# Patient Record
Sex: MALE | Race: WHITE | NOT HISPANIC OR LATINO | URBAN - METROPOLITAN AREA
[De-identification: names, ages, dates, MRNs, and addresses within clinical notes are randomized per-mention and may not be internally consistent; named-entity substitution may affect disease eponyms.]

---

## 2019-05-03 ENCOUNTER — INPATIENT (INPATIENT)
Facility: HOSPITAL | Age: 44
LOS: 1 days | Discharge: HOME | DRG: 603 | End: 2019-05-05
Attending: GENERAL ACUTE CARE HOSPITAL | Admitting: HOSPITALIST
Payer: COMMERCIAL

## 2019-05-03 VITALS
DIASTOLIC BLOOD PRESSURE: 96 MMHG | TEMPERATURE: 99 F | OXYGEN SATURATION: 96 % | HEIGHT: 74 IN | HEART RATE: 96 BPM | SYSTOLIC BLOOD PRESSURE: 155 MMHG | WEIGHT: 315 LBS | RESPIRATION RATE: 18 BRPM

## 2019-05-03 LAB
ALBUMIN SERPL ELPH-MCNC: 3.5 G/DL — SIGNIFICANT CHANGE UP (ref 3.3–5.2)
ALP SERPL-CCNC: 87 U/L — SIGNIFICANT CHANGE UP (ref 40–120)
ALT FLD-CCNC: 34 U/L — SIGNIFICANT CHANGE UP
ANION GAP SERPL CALC-SCNC: 10 MMOL/L — SIGNIFICANT CHANGE UP (ref 5–17)
AST SERPL-CCNC: 26 U/L — SIGNIFICANT CHANGE UP
BASOPHILS # BLD AUTO: 0 K/UL — SIGNIFICANT CHANGE UP (ref 0–0.2)
BASOPHILS NFR BLD AUTO: 0.2 % — SIGNIFICANT CHANGE UP (ref 0–2)
BILIRUB SERPL-MCNC: 0.3 MG/DL — LOW (ref 0.4–2)
BUN SERPL-MCNC: 13 MG/DL — SIGNIFICANT CHANGE UP (ref 8–20)
CALCIUM SERPL-MCNC: 9 MG/DL — SIGNIFICANT CHANGE UP (ref 8.6–10.2)
CHLORIDE SERPL-SCNC: 102 MMOL/L — SIGNIFICANT CHANGE UP (ref 98–107)
CO2 SERPL-SCNC: 28 MMOL/L — SIGNIFICANT CHANGE UP (ref 22–29)
CREAT SERPL-MCNC: 0.87 MG/DL — SIGNIFICANT CHANGE UP (ref 0.5–1.3)
EOSINOPHIL # BLD AUTO: 0.2 K/UL — SIGNIFICANT CHANGE UP (ref 0–0.5)
EOSINOPHIL NFR BLD AUTO: 2.1 % — SIGNIFICANT CHANGE UP (ref 0–5)
GLUCOSE SERPL-MCNC: 111 MG/DL — SIGNIFICANT CHANGE UP (ref 70–115)
HCT VFR BLD CALC: 42.2 % — SIGNIFICANT CHANGE UP (ref 42–52)
HGB BLD-MCNC: 13.5 G/DL — LOW (ref 14–18)
LACTATE BLDV-MCNC: 1.2 MMOL/L — SIGNIFICANT CHANGE UP (ref 0.5–2)
LYMPHOCYTES # BLD AUTO: 26 % — SIGNIFICANT CHANGE UP (ref 20–55)
LYMPHOCYTES # BLD AUTO: 3.1 K/UL — SIGNIFICANT CHANGE UP (ref 1–4.8)
MCHC RBC-ENTMCNC: 29.5 PG — SIGNIFICANT CHANGE UP (ref 27–31)
MCHC RBC-ENTMCNC: 32 G/DL — SIGNIFICANT CHANGE UP (ref 32–36)
MCV RBC AUTO: 92.1 FL — SIGNIFICANT CHANGE UP (ref 80–94)
MONOCYTES # BLD AUTO: 0.6 K/UL — SIGNIFICANT CHANGE UP (ref 0–0.8)
MONOCYTES NFR BLD AUTO: 4.7 % — SIGNIFICANT CHANGE UP (ref 3–10)
NEUTROPHILS # BLD AUTO: 8 K/UL — SIGNIFICANT CHANGE UP (ref 1.8–8)
NEUTROPHILS NFR BLD AUTO: 66.6 % — SIGNIFICANT CHANGE UP (ref 37–73)
PLATELET # BLD AUTO: 306 K/UL — SIGNIFICANT CHANGE UP (ref 150–400)
POTASSIUM SERPL-MCNC: 3.8 MMOL/L — SIGNIFICANT CHANGE UP (ref 3.5–5.3)
POTASSIUM SERPL-SCNC: 3.8 MMOL/L — SIGNIFICANT CHANGE UP (ref 3.5–5.3)
PROT SERPL-MCNC: 6.8 G/DL — SIGNIFICANT CHANGE UP (ref 6.6–8.7)
RBC # BLD: 4.58 M/UL — LOW (ref 4.6–6.2)
RBC # FLD: 13 % — SIGNIFICANT CHANGE UP (ref 11–15.6)
SODIUM SERPL-SCNC: 140 MMOL/L — SIGNIFICANT CHANGE UP (ref 135–145)
WBC # BLD: 12 K/UL — HIGH (ref 4.8–10.8)
WBC # FLD AUTO: 12 K/UL — HIGH (ref 4.8–10.8)

## 2019-05-03 PROCEDURE — 73590 X-RAY EXAM OF LOWER LEG: CPT | Mod: 26,LT

## 2019-05-03 PROCEDURE — 73620 X-RAY EXAM OF FOOT: CPT | Mod: 26,LT

## 2019-05-03 PROCEDURE — 73610 X-RAY EXAM OF ANKLE: CPT | Mod: 26,LT

## 2019-05-03 PROCEDURE — 99285 EMERGENCY DEPT VISIT HI MDM: CPT

## 2019-05-03 RX ORDER — SODIUM CHLORIDE 9 MG/ML
1000 INJECTION INTRAMUSCULAR; INTRAVENOUS; SUBCUTANEOUS ONCE
Qty: 0 | Refills: 0 | Status: COMPLETED | OUTPATIENT
Start: 2019-05-03 | End: 2019-05-03

## 2019-05-03 RX ORDER — KETOROLAC TROMETHAMINE 30 MG/ML
15 SYRINGE (ML) INJECTION ONCE
Qty: 0 | Refills: 0 | Status: DISCONTINUED | OUTPATIENT
Start: 2019-05-03 | End: 2019-05-03

## 2019-05-03 RX ADMIN — SODIUM CHLORIDE 1000 MILLILITER(S): 9 INJECTION INTRAMUSCULAR; INTRAVENOUS; SUBCUTANEOUS at 22:20

## 2019-05-03 RX ADMIN — Medication 100 MILLIGRAM(S): at 21:27

## 2019-05-03 RX ADMIN — Medication 600 MILLIGRAM(S): at 21:57

## 2019-05-03 RX ADMIN — Medication 15 MILLIGRAM(S): at 21:20

## 2019-05-03 RX ADMIN — SODIUM CHLORIDE 1000 MILLILITER(S): 9 INJECTION INTRAMUSCULAR; INTRAVENOUS; SUBCUTANEOUS at 21:20

## 2019-05-03 NOTE — ED PROVIDER NOTE - ATTENDING CONTRIBUTION TO CARE
I, Jeff Shaw, performed a face to face bedside interview with this patient regarding history of present illness, review of symptoms and relevant past medical, social and family history.  I completed an independent physical examination. I have communicated the patient’s plan of care and disposition with the ACP.  43 year old male presents with 1 week of progressive LLE swelling, redness, pain, no fevers, no chets pain, SOB  Gen: NAD, well appearing  CV: RRR  Pul: CTA b/l  Abd: Soft, non-distended, non-tender  Neuro: no focal deficits  msk: circumferential erythema, warmth, swelling to L leg  Significant cellulitis, will admit

## 2019-05-03 NOTE — ED PROVIDER NOTE - CLINICAL SUMMARY MEDICAL DECISION MAKING FREE TEXT BOX
left leg swollen and cellulitis changes x 1 week none traumatic  . with swollen from the blow the knee to toes   Xrays- US LLE- labs - fluid- clinda- toradol- re eval

## 2019-05-03 NOTE — ED PROVIDER NOTE - OBJECTIVE STATEMENT
43 Y.o male No Sig PMh  present in Er and  C.o left lower leg swollen s.p about a week ago heard the popped in his calf area that gradually become worse with swollen and pain . states he was feeling warm and had fever but he did not take temp and take cold med , no other medications. states the redness on the left shin area is itching some times , denies any direct trauma or injury or bite . pt denies any pain now in ER while is walking or any N.T . no other compliant or concern 43 Y.o male No Sig PMh  present in Er and  C.o left lower leg swollen s.p about a week ago heard the popped in his calf area that gradually become worse with swollen and pain . states he was feeling warm and had fever but he did not take temp and take cold med , no other medications. states the redness on the left shin area is itching some times , denies any direct trauma or injury or bite . pt denies any pain now in ER while is walking or any N.T . no other compliant or concern  pt has no PCP last time follow up was yrs ago

## 2019-05-03 NOTE — ED PROVIDER NOTE - PHYSICAL EXAMINATION
LLE swollen noted from the below the knee to the toes , ant knee with skin decoloration noted ( redness) warm to touch , mild TTP , no skin breakage - distal pulse + 2  able to move the toes - walking with normal steady gait

## 2019-05-03 NOTE — ED PROVIDER NOTE - CARE PLAN
Principal Discharge DX:	Cellulitis of left lower extremity  Secondary Diagnosis:	Pain and swelling of left lower extremity

## 2019-05-04 DIAGNOSIS — M79.605 PAIN IN LEFT LEG: ICD-10-CM

## 2019-05-04 DIAGNOSIS — R09.89 OTHER SPECIFIED SYMPTOMS AND SIGNS INVOLVING THE CIRCULATORY AND RESPIRATORY SYSTEMS: ICD-10-CM

## 2019-05-04 LAB
ANION GAP SERPL CALC-SCNC: 10 MMOL/L — SIGNIFICANT CHANGE UP (ref 5–17)
APTT BLD: 33.7 SEC — SIGNIFICANT CHANGE UP (ref 27.5–36.3)
BASOPHILS # BLD AUTO: 0 K/UL — SIGNIFICANT CHANGE UP (ref 0–0.2)
BASOPHILS NFR BLD AUTO: 0.4 % — SIGNIFICANT CHANGE UP (ref 0–2)
BUN SERPL-MCNC: 13 MG/DL — SIGNIFICANT CHANGE UP (ref 8–20)
CALCIUM SERPL-MCNC: 9 MG/DL — SIGNIFICANT CHANGE UP (ref 8.6–10.2)
CHLORIDE SERPL-SCNC: 104 MMOL/L — SIGNIFICANT CHANGE UP (ref 98–107)
CHOLEST SERPL-MCNC: 155 MG/DL — SIGNIFICANT CHANGE UP (ref 110–199)
CO2 SERPL-SCNC: 28 MMOL/L — SIGNIFICANT CHANGE UP (ref 22–29)
CREAT SERPL-MCNC: 0.74 MG/DL — SIGNIFICANT CHANGE UP (ref 0.5–1.3)
EOSINOPHIL # BLD AUTO: 0.3 K/UL — SIGNIFICANT CHANGE UP (ref 0–0.5)
EOSINOPHIL NFR BLD AUTO: 2.5 % — SIGNIFICANT CHANGE UP (ref 0–5)
GLUCOSE SERPL-MCNC: 99 MG/DL — SIGNIFICANT CHANGE UP (ref 70–115)
HBA1C BLD-MCNC: 5.6 % — SIGNIFICANT CHANGE UP (ref 4–5.6)
HCT VFR BLD CALC: 40.5 % — LOW (ref 42–52)
HDLC SERPL-MCNC: 30 MG/DL — LOW
HGB BLD-MCNC: 12.9 G/DL — LOW (ref 14–18)
INR BLD: 1.11 RATIO — SIGNIFICANT CHANGE UP (ref 0.88–1.16)
LIPID PNL WITH DIRECT LDL SERPL: 104 MG/DL — SIGNIFICANT CHANGE UP
LYMPHOCYTES # BLD AUTO: 2.8 K/UL — SIGNIFICANT CHANGE UP (ref 1–4.8)
LYMPHOCYTES # BLD AUTO: 26.4 % — SIGNIFICANT CHANGE UP (ref 20–55)
MCHC RBC-ENTMCNC: 29.6 PG — SIGNIFICANT CHANGE UP (ref 27–31)
MCHC RBC-ENTMCNC: 31.9 G/DL — LOW (ref 32–36)
MCV RBC AUTO: 92.9 FL — SIGNIFICANT CHANGE UP (ref 80–94)
MONOCYTES # BLD AUTO: 0.5 K/UL — SIGNIFICANT CHANGE UP (ref 0–0.8)
MONOCYTES NFR BLD AUTO: 5 % — SIGNIFICANT CHANGE UP (ref 3–10)
NEUTROPHILS # BLD AUTO: 6.9 K/UL — SIGNIFICANT CHANGE UP (ref 1.8–8)
NEUTROPHILS NFR BLD AUTO: 65.3 % — SIGNIFICANT CHANGE UP (ref 37–73)
PLATELET # BLD AUTO: 300 K/UL — SIGNIFICANT CHANGE UP (ref 150–400)
POTASSIUM SERPL-MCNC: 4.1 MMOL/L — SIGNIFICANT CHANGE UP (ref 3.5–5.3)
POTASSIUM SERPL-SCNC: 4.1 MMOL/L — SIGNIFICANT CHANGE UP (ref 3.5–5.3)
PROTHROM AB SERPL-ACNC: 12.8 SEC — SIGNIFICANT CHANGE UP (ref 10–12.9)
RBC # BLD: 4.36 M/UL — LOW (ref 4.6–6.2)
RBC # FLD: 13.1 % — SIGNIFICANT CHANGE UP (ref 11–15.6)
SODIUM SERPL-SCNC: 142 MMOL/L — SIGNIFICANT CHANGE UP (ref 135–145)
TOTAL CHOLESTEROL/HDL RATIO MEASUREMENT: 5 RATIO — SIGNIFICANT CHANGE UP (ref 3.4–9.6)
TRIGL SERPL-MCNC: 106 MG/DL — SIGNIFICANT CHANGE UP (ref 10–200)
WBC # BLD: 10.5 K/UL — SIGNIFICANT CHANGE UP (ref 4.8–10.8)
WBC # FLD AUTO: 10.5 K/UL — SIGNIFICANT CHANGE UP (ref 4.8–10.8)

## 2019-05-04 PROCEDURE — 12345: CPT | Mod: NC

## 2019-05-04 PROCEDURE — 99223 1ST HOSP IP/OBS HIGH 75: CPT | Mod: GC

## 2019-05-04 PROCEDURE — 93971 EXTREMITY STUDY: CPT | Mod: 26,LT

## 2019-05-04 PROCEDURE — 73701 CT LOWER EXTREMITY W/DYE: CPT | Mod: 26,LT

## 2019-05-04 PROCEDURE — 99497 ADVNCD CARE PLAN 30 MIN: CPT | Mod: 25

## 2019-05-04 RX ORDER — KETOROLAC TROMETHAMINE 30 MG/ML
15 SYRINGE (ML) INJECTION EVERY 8 HOURS
Qty: 0 | Refills: 0 | Status: DISCONTINUED | OUTPATIENT
Start: 2019-05-04 | End: 2019-05-04

## 2019-05-04 RX ORDER — LACTOBACILLUS ACIDOPHILUS 100MM CELL
1 CAPSULE ORAL
Qty: 0 | Refills: 0 | Status: DISCONTINUED | OUTPATIENT
Start: 2019-05-04 | End: 2019-05-05

## 2019-05-04 RX ORDER — INFLUENZA VIRUS VACCINE 15; 15; 15; 15 UG/.5ML; UG/.5ML; UG/.5ML; UG/.5ML
0.5 SUSPENSION INTRAMUSCULAR ONCE
Qty: 0 | Refills: 0 | Status: COMPLETED | OUTPATIENT
Start: 2019-05-04 | End: 2019-05-04

## 2019-05-04 RX ORDER — ENOXAPARIN SODIUM 100 MG/ML
140 INJECTION SUBCUTANEOUS ONCE
Qty: 0 | Refills: 0 | Status: DISCONTINUED | OUTPATIENT
Start: 2019-05-04 | End: 2019-05-04

## 2019-05-04 RX ORDER — ACETAMINOPHEN 500 MG
650 TABLET ORAL EVERY 6 HOURS
Qty: 0 | Refills: 0 | Status: DISCONTINUED | OUTPATIENT
Start: 2019-05-04 | End: 2019-05-05

## 2019-05-04 RX ORDER — IBUPROFEN 200 MG
600 TABLET ORAL EVERY 8 HOURS
Qty: 0 | Refills: 0 | Status: DISCONTINUED | OUTPATIENT
Start: 2019-05-04 | End: 2019-05-05

## 2019-05-04 RX ORDER — ENOXAPARIN SODIUM 100 MG/ML
120 INJECTION SUBCUTANEOUS ONCE
Qty: 0 | Refills: 0 | Status: COMPLETED | OUTPATIENT
Start: 2019-05-04 | End: 2019-05-04

## 2019-05-04 RX ADMIN — ENOXAPARIN SODIUM 120 MILLIGRAM(S): 100 INJECTION SUBCUTANEOUS at 02:22

## 2019-05-04 RX ADMIN — Medication 1 TABLET(S): at 05:54

## 2019-05-04 RX ADMIN — Medication 100 MILLIGRAM(S): at 05:54

## 2019-05-04 RX ADMIN — Medication 100 MILLIGRAM(S): at 11:37

## 2019-05-04 RX ADMIN — Medication 1 TABLET(S): at 17:11

## 2019-05-04 RX ADMIN — Medication 100 MILLIGRAM(S): at 21:44

## 2019-05-04 NOTE — H&P ADULT - NSHPSOCIALHISTORY_GEN_ALL_CORE
- Lives with spouse and  child.  - Denies tobacco, etoh and drugs.  - Patient works as . Sedentary lifestyle.

## 2019-05-04 NOTE — CONSULT NOTE ADULT - SUBJECTIVE AND OBJECTIVE BOX
HPI:  44 yo male with no past medical history, presented to ED with chief complain on left leg edema.    Patient started history of present illness 1 week prior to admission characterized by worsening edema of left lower extremity. As per patient, he felt something popping on calf area last Saturday while he was pushing his truck, followed by swelling initially on calf area which subsequently extended distally to ankle and left foot. Patient states initially had pain 7/10 that partially was relieved with Motrin, but pain in now has improved. Duplex US showed that he has no DVT however there is a complex collection 15 x5cm in the calf of LLE    Patient denies any fever, chills, nausea, vomiting , chest pain or SOB. Patient has no DM , has normal ROM and adequate pulses.     PAST MEDICAL & SURGICAL HISTORY:  No pertinent past medical history  No significant past surgical history      REVIEW OF SYSTEMS  negative except above.         MEDICATIONS  (STANDING):  clindamycin IVPB 600 milliGRAM(s) IV Intermittent every 8 hours  influenza   Vaccine 0.5 milliLiter(s) IntraMuscular once  lactobacillus acidophilus 1 Tablet(s) Oral two times a day    MEDICATIONS  (PRN):  acetaminophen    Suspension .. 650 milliGRAM(s) Oral every 6 hours PRN Temp greater or equal to 38C (100.4F), Mild Pain (1 - 3)  ibuprofen  Tablet. 600 milliGRAM(s) Oral every 8 hours PRN Moderate Pain (4 - 6)      Allergies    No Known Allergies    Intolerances        SOCIAL HISTORY:    FAMILY HISTORY:  FHx: multiple myeloma: Father      Vital Signs Last 24 Hrs  T(C): 36.6 (04 May 2019 08:01), Max: 37.3 (03 May 2019 19:35)  T(F): 97.9 (04 May 2019 08:01), Max: 99.1 (03 May 2019 19:35)  HR: 96 (04 May 2019 08:01) (71 - 96)  BP: 148/88 (04 May 2019 08:01) (141/80 - 155/96)  BP(mean): --  RR: 20 (04 May 2019 08:01) (17 - 20)  SpO2: 93% (04 May 2019 08:01) (93% - 96%)    PHYSICAL EXAM:      Constitutional: NAD AAox3    Eyes: EOMI    ENMT: normal    Neck: supple    Respiratory: CTAB    Cardiovascular: RRR    Gastrointestinal: soft NT ND    Extremities: LLE swelling in calf with ecchymosis tracking down to the ankle. Mild TTP on palpation, 5/5 Motor SILT , +2 DP          LABS:                        13.5   12.0  )-----------( 306      ( 03 May 2019 21:26 )             42.2     05-04    142  |  104  |  13.0  ----------------------------<  99  4.1   |  28.0  |  0.74    Ca    9.0      04 May 2019 09:51    TPro  6.8  /  Alb  3.5  /  TBili  0.3<L>  /  DBili  x   /  AST  26  /  ALT  34  /  AlkPhos  87  05-03    PT/INR - ( 04 May 2019 02:21 )   PT: 12.8 sec;   INR: 1.11 ratio         PTT - ( 04 May 2019 02:21 )  PTT:33.7 sec      RADIOLOGY & ADDITIONAL STUDIES:

## 2019-05-04 NOTE — H&P ADULT - HISTORY OF PRESENT ILLNESS
44 yo male with no past medical history, presented to ED with chief complain on left leg edema.    Patient started history of present illness 1 week prior to admission characterized by worsening edema of left lower extremity. As per patient, he felt something poping on calf area last Saturday followed by swelling initially on calf area with mild erythema which subsequently extended distally to ankle and left foot. Patient states initially had pain 7/10 that attenuated partially with Motrin, but pain in now 2-3/10, patient has also noticed bluish decoloration of toes for the last 24 hours. Concomitantly, chills and non-quantified hyperthermia.    Patient has no PMD, no recent medical check ups.     Patient denies trauma, fall, smoking, previous h/o of similar symptoms, SOB, palpitations. 44 yo male with no past medical history, presented to ED with chief complain on left leg edema.    Patient started history of present illness 1 week prior to admission characterized by worsening edema of left lower extremity. As per patient, he felt something popping on calf area last Saturday followed by swelling initially on calf area with mild erythema which subsequently extended distally to ankle and left foot. Patient states initially had pain 7/10 that partially was relieved with Motrin, but pain in now 2-3/10, patient has also noticed bluish decoloration of toes for the last 24 hours. Concomitantly, chills and non-quantified hyperthermia.    Patient has no PMD, no recent medical check ups.     Patient denies trauma, fall, smoking, previous h/o of similar symptoms, SOB, palpitations, no family h/o blood clots or disorders, no h/o prolonged immobility or recent travel.

## 2019-05-04 NOTE — H&P ADULT - NSHPPHYSICALEXAM_GEN_ALL_CORE
Vital Signs Last 24 Hrs  T(C): 36.6 (03 May 2019 23:30), Max: 37.3 (03 May 2019 19:35)  T(F): 97.9 (03 May 2019 23:30), Max: 99.1 (03 May 2019 19:35)  HR: 78 (03 May 2019 23:30) (78 - 96)  BP: 141/80 (03 May 2019 23:30) (141/80 - 155/96)  RR: 17 (03 May 2019 23:30) (17 - 18)  SpO2: 96% (03 May 2019 23:30) (96% - 96%)    · Physical Examination:   - General: Not in acute distress. Awake, alert and oriented x 4.   - HEENT: clear conjunctiva b/l, EOMI. Moist oral mucosa. Neck is supple.  - Chest: Symmetrical, normal respiratory rate and effort. Lungs are clear to auscultation b/l/ .  - CV: Regular heart rate and rhythm. No murmurs, rubs, gallops. Capillary refill <2s  - Abdomen: Obese. Non tender, non distended. Bowel sounds +4 quadrants.   - Extremities: Varicose veins in varying degrees noted b/l. LLE edema noted extending from below the knee to the toes , mild TTP , no skin breakage - distal pulse + 2  able to move the toes. +Weight bearing. No calf tenderness. Peripheral pulses difficult to assess on Left extremity 2/2 edema.   - SKIN: - Left leg: skin is warm to touch, erythema and peau d'orange appearance on anterior and medial aspect of the leg. no skin breakage appreciated. +3 pitting edema.  - MSK: normal range of motion  - Neuro: Alert and oriented, no focal deficits. Vital Signs Last 24 Hrs  T(C): 36.6 (03 May 2019 23:30), Max: 37.3 (03 May 2019 19:35)  T(F): 97.9 (03 May 2019 23:30), Max: 99.1 (03 May 2019 19:35)  HR: 78 (03 May 2019 23:30) (78 - 96)  BP: 141/80 (03 May 2019 23:30) (141/80 - 155/96)  RR: 17 (03 May 2019 23:30) (17 - 18)  SpO2: 96% (03 May 2019 23:30) (96% - 96%)    · Physical Examination:   - General: Not in acute distress. Awake, alert and oriented x 4.   - HEENT: clear conjunctiva b/l, EOMI. Moist oral mucosa. Neck is supple.  - Resp: Symmetrical, normal respiratory rate and effort. Lungs are clear to auscultation b/l/ .  - CV: Regular heart rate and rhythm. No murmurs, rubs, gallops. Capillary refill <2s  - GI: Obese. Non tender, non distended. Bowel sounds +4 quadrants.   - Extremities: Varicose veins in varying degrees noted b/l. LLE edema noted extending from below the knee to the toes , mild TTP , no skin breakage - distal pulse + 2  able to move the toes. +Weight bearing. No calf tenderness. Peripheral pulses difficult to assess on Left extremity 2/2 edema.   - SKIN: - Left leg: skin is warm to touch, erythema and peau d'orange appearance on anterior and medial aspect of the leg. no skin breakage appreciated. +3 pitting edema.  - MSK: normal range of motion  - Neuro: Alert and oriented, no focal deficits.

## 2019-05-04 NOTE — ED ADULT NURSE REASSESSMENT NOTE - NS ED NURSE REASSESS COMMENT FT1
Pt still awaiting US. Pt admitted, MD Harrell and Pa aware patient still has not received Sono. As per MD Harrell, patient is to receive Lovenox prior to sono results. Order clarified with PA. Will administer medication per MD order.

## 2019-05-04 NOTE — CHART NOTE - NSCHARTNOTEFT_GEN_A_CORE
Patient is a 43 year old male with no significant PMH who presented with worsening LLE edema for the past week and admitted for work up. Patient reports hearing a "popping" sound last week while pushing a vehicle. Duplex with complex fluid collection. CT ordered and Surgery consulted.    Vitals stable  Physical Exam:  General: Middle aged male, laying in bed, NAD  HEENT: MMM  CVS: RRR, + S1/S2  Resp: bilateral air entry, coarse breath sounds  GI: soft, NT/ND  Ext: left lower extremity edema, tenderness, erythema and warm to touch    Plan:   CT of the LLE ordered stat  Continue empiric clindamycin for now  Elevate leg and apply warm compresses   ACE wrap per surgery recommendations   No acute surgical intervention at this time  Analgesia as needed    Plan discussed with patient, RN and surgery resident

## 2019-05-04 NOTE — CONSULT NOTE ADULT - ASSESSMENT
43 YM with LLE calf swelling and ecchymosis, 15 x5 cm complex fluid collection most likely hematoma 2/2 tear in gastroc muscle , (traumatic)    - would recommend CT scan, leg elevation warm compresses and compression with ace wrap.  - FU leucocytosis   - no surgical intervention needed at this time.     Patient seen and examined with attending Dr. Witt who agrees on above plan.

## 2019-05-04 NOTE — ED ADULT NURSE REASSESSMENT NOTE - NS ED NURSE REASSESS COMMENT FT1
Assumed care of patient from ongoing RN at 2300, alert and oriented x4, resting comfortably. No s/s of distress noted. Left lower extremity swelling noted. Pt denies numbness or tingling. Unable to palpate pedal pulse. Pedal pulse heard via doppler. Awaiting sono at this time.

## 2019-05-04 NOTE — H&P ADULT - ASSESSMENT
44 yo male with no past medical history, presented to ED with chief complain on left leg edema concerning for DVT.  As patient has no PMD and risk of no follow up as OP are high, patient will be admitted for further workup.    PROBLEMS.    Left Lower extremity edema concerning for DVT  - Infectious cause less likely as no marked leucocytosis and no left shift noted.  - Calf tenderness as primary symptoms with progressively worsening edema.  - Doppler US pending.  - Tylenol, Ibuprofen, Toradol PRN mild, mod, sev pain respectively.   - S/l Lovenox full dose.  - F/u cbc, cmp in AM    Obesity  - Approx. BMI >30  - Will order Lipid profile, A1c    Elevated BP  - No prior h/o hypertension.  - F/u with PMD as outpatient.     Preventive measures  - Full dose Lovenox, as mentioned above.  - Bedrest, increase activity as tolerated.     Advance Directives: HCP, mother. If mother not present, spouse. FULL CODE. 44 yo male with no past medical history, presented to ED with chief complain on left leg edema concerning for DVT.  As patient has no PMD and risk of no follow up as OP are high, patient will be admitted for further workup.    PROBLEMS.    Left Lower extremity edema concerning for DVT  - Infectious cause less likely as no marked leucocytosis and no left shift noted.  - Calf tenderness as primary symptoms with progressively worsening edema.  - Doppler US pending.  - Tylenol, Ibuprofen, Toradol PRN mild, mod, sev pain respectively.   - S/p Lovenox full dose.  - F/u cbc, cmp in AM    Obesity  - Approx. BMI >30  - Will order Lipid profile, A1c    Elevated BP  - No prior h/o hypertension.  - F/u with PMD as outpatient.     Preventive measures  - Full dose Lovenox, as mentioned above.  - Bedrest, increase activity as tolerated.     Advance Directives: HCP, mother. If mother not present, spouse. FULL CODE.

## 2019-05-05 ENCOUNTER — TRANSCRIPTION ENCOUNTER (OUTPATIENT)
Age: 44
End: 2019-05-05

## 2019-05-05 VITALS
HEART RATE: 78 BPM | RESPIRATION RATE: 18 BRPM | DIASTOLIC BLOOD PRESSURE: 73 MMHG | TEMPERATURE: 98 F | SYSTOLIC BLOOD PRESSURE: 149 MMHG

## 2019-05-05 LAB
ANION GAP SERPL CALC-SCNC: 10 MMOL/L — SIGNIFICANT CHANGE UP (ref 5–17)
BASOPHILS # BLD AUTO: 0 K/UL — SIGNIFICANT CHANGE UP (ref 0–0.2)
BASOPHILS NFR BLD AUTO: 0.4 % — SIGNIFICANT CHANGE UP (ref 0–2)
BUN SERPL-MCNC: 12 MG/DL — SIGNIFICANT CHANGE UP (ref 8–20)
CALCIUM SERPL-MCNC: 9.3 MG/DL — SIGNIFICANT CHANGE UP (ref 8.6–10.2)
CHLORIDE SERPL-SCNC: 102 MMOL/L — SIGNIFICANT CHANGE UP (ref 98–107)
CO2 SERPL-SCNC: 27 MMOL/L — SIGNIFICANT CHANGE UP (ref 22–29)
CREAT SERPL-MCNC: 0.8 MG/DL — SIGNIFICANT CHANGE UP (ref 0.5–1.3)
EOSINOPHIL # BLD AUTO: 0.2 K/UL — SIGNIFICANT CHANGE UP (ref 0–0.5)
EOSINOPHIL NFR BLD AUTO: 1.9 % — SIGNIFICANT CHANGE UP (ref 0–5)
GLUCOSE SERPL-MCNC: 102 MG/DL — SIGNIFICANT CHANGE UP (ref 70–115)
HCT VFR BLD CALC: 42.2 % — SIGNIFICANT CHANGE UP (ref 42–52)
HGB BLD-MCNC: 13.3 G/DL — LOW (ref 14–18)
LYMPHOCYTES # BLD AUTO: 2.1 K/UL — SIGNIFICANT CHANGE UP (ref 1–4.8)
LYMPHOCYTES # BLD AUTO: 21 % — SIGNIFICANT CHANGE UP (ref 20–55)
MAGNESIUM SERPL-MCNC: 2.1 MG/DL — SIGNIFICANT CHANGE UP (ref 1.6–2.6)
MCHC RBC-ENTMCNC: 29.6 PG — SIGNIFICANT CHANGE UP (ref 27–31)
MCHC RBC-ENTMCNC: 31.5 G/DL — LOW (ref 32–36)
MCV RBC AUTO: 93.8 FL — SIGNIFICANT CHANGE UP (ref 80–94)
MONOCYTES # BLD AUTO: 0.6 K/UL — SIGNIFICANT CHANGE UP (ref 0–0.8)
MONOCYTES NFR BLD AUTO: 5.8 % — SIGNIFICANT CHANGE UP (ref 3–10)
NEUTROPHILS # BLD AUTO: 7.1 K/UL — SIGNIFICANT CHANGE UP (ref 1.8–8)
NEUTROPHILS NFR BLD AUTO: 70.5 % — SIGNIFICANT CHANGE UP (ref 37–73)
PHOSPHATE SERPL-MCNC: 2.6 MG/DL — SIGNIFICANT CHANGE UP (ref 2.4–4.7)
PLATELET # BLD AUTO: 288 K/UL — SIGNIFICANT CHANGE UP (ref 150–400)
POTASSIUM SERPL-MCNC: 4.3 MMOL/L — SIGNIFICANT CHANGE UP (ref 3.5–5.3)
POTASSIUM SERPL-SCNC: 4.3 MMOL/L — SIGNIFICANT CHANGE UP (ref 3.5–5.3)
RBC # BLD: 4.5 M/UL — LOW (ref 4.6–6.2)
RBC # FLD: 13.2 % — SIGNIFICANT CHANGE UP (ref 11–15.6)
SODIUM SERPL-SCNC: 139 MMOL/L — SIGNIFICANT CHANGE UP (ref 135–145)
WBC # BLD: 10 K/UL — SIGNIFICANT CHANGE UP (ref 4.8–10.8)
WBC # FLD AUTO: 10 K/UL — SIGNIFICANT CHANGE UP (ref 4.8–10.8)

## 2019-05-05 PROCEDURE — 93971 EXTREMITY STUDY: CPT

## 2019-05-05 PROCEDURE — 99239 HOSP IP/OBS DSCHRG MGMT >30: CPT

## 2019-05-05 PROCEDURE — 82553 CREATINE MB FRACTION: CPT

## 2019-05-05 PROCEDURE — 87040 BLOOD CULTURE FOR BACTERIA: CPT

## 2019-05-05 PROCEDURE — 83036 HEMOGLOBIN GLYCOSYLATED A1C: CPT

## 2019-05-05 PROCEDURE — 36415 COLL VENOUS BLD VENIPUNCTURE: CPT

## 2019-05-05 PROCEDURE — 80053 COMPREHEN METABOLIC PANEL: CPT

## 2019-05-05 PROCEDURE — 85027 COMPLETE CBC AUTOMATED: CPT

## 2019-05-05 PROCEDURE — 99285 EMERGENCY DEPT VISIT HI MDM: CPT | Mod: 25

## 2019-05-05 PROCEDURE — 73610 X-RAY EXAM OF ANKLE: CPT

## 2019-05-05 PROCEDURE — 85730 THROMBOPLASTIN TIME PARTIAL: CPT

## 2019-05-05 PROCEDURE — 73590 X-RAY EXAM OF LOWER LEG: CPT

## 2019-05-05 PROCEDURE — 85610 PROTHROMBIN TIME: CPT

## 2019-05-05 PROCEDURE — 84100 ASSAY OF PHOSPHORUS: CPT

## 2019-05-05 PROCEDURE — 83735 ASSAY OF MAGNESIUM: CPT

## 2019-05-05 PROCEDURE — 80048 BASIC METABOLIC PNL TOTAL CA: CPT

## 2019-05-05 PROCEDURE — 73701 CT LOWER EXTREMITY W/DYE: CPT

## 2019-05-05 PROCEDURE — 96365 THER/PROPH/DIAG IV INF INIT: CPT

## 2019-05-05 PROCEDURE — 73620 X-RAY EXAM OF FOOT: CPT

## 2019-05-05 PROCEDURE — 83605 ASSAY OF LACTIC ACID: CPT

## 2019-05-05 PROCEDURE — 96375 TX/PRO/DX INJ NEW DRUG ADDON: CPT

## 2019-05-05 PROCEDURE — 82550 ASSAY OF CK (CPK): CPT

## 2019-05-05 PROCEDURE — 80061 LIPID PANEL: CPT

## 2019-05-05 RX ORDER — LACTOBACILLUS ACIDOPHILUS 100MM CELL
1 CAPSULE ORAL
Qty: 14 | Refills: 0 | OUTPATIENT
Start: 2019-05-05 | End: 2019-05-11

## 2019-05-05 RX ADMIN — Medication 1 TABLET(S): at 06:20

## 2019-05-05 RX ADMIN — Medication 100 MILLIGRAM(S): at 06:20

## 2019-05-05 RX ADMIN — Medication 100 MILLIGRAM(S): at 11:26

## 2019-05-05 NOTE — DISCHARGE NOTE NURSING/CASE MANAGEMENT/SOCIAL WORK - NSDCDPATPORTLINK_GEN_ALL_CORE
You can access the [facility] Portal, offered by [facility], by registering with the following website: [email or web address]

## 2019-05-05 NOTE — DISCHARGE NOTE PROVIDER - NSDCCPCAREPLAN_GEN_ALL_CORE_FT
PRINCIPAL DISCHARGE DIAGNOSIS  Diagnosis: Cellulitis of left lower extremity  Assessment and Plan of Treatment: please continue antibiotics and probiotic as instructed      SECONDARY DISCHARGE DIAGNOSES  Diagnosis: Pain and swelling of left lower extremity  Assessment and Plan of Treatment: due to gastrocnemius muscle rupture  no surgical intervention is required  please keep leg elevated  apply ACE wrap as directed and use warm compresses as need  weight baring on left lower extremity as tolerated  please call to make an appointment at [facility] on [address] at [phone] this upcoming week  if pain or swelling worsen; please return to the ED for further evaluation

## 2019-05-05 NOTE — DISCHARGE NOTE NURSING/CASE MANAGEMENT/SOCIAL WORK - NURSING SECTION COMPLETE
Pt provided with breakfast tray. Pt consumed 100% of breakfast tray. Patient/Caregiver provided printed discharge information.

## 2019-05-05 NOTE — PROGRESS NOTE ADULT - SUBJECTIVE AND OBJECTIVE BOX
HPI/OVERNIGHT EVENTS: Patient seen and examined at bedside this AM. No acute events overnight per nursing reports. Pain well controlled. Denies fever, chills, nausea, vomitting, chest pain, SOB, dizziness, abd pain or any other concerning symptoms    Vital Signs Last 24 Hrs  T(C): 37.1 (05 May 2019 07:59), Max: 37.1 (05 May 2019 07:59)  T(F): 98.7 (05 May 2019 07:59), Max: 98.7 (05 May 2019 07:59)  HR: 87 (05 May 2019 07:59) (79 - 90)  BP: 127/77 (05 May 2019 07:59) (127/77 - 146/87)  BP(mean): --  RR: 19 (05 May 2019 07:59) (18 - 19)  SpO2: --    Constitutional: NAD AAox3    Eyes: EOMI    ENMT: normal    Neck: supple    Respiratory: CTAB    Cardiovascular: RRR    Gastrointestinal: soft NT ND    Extremities: LLE swelling in calf with ecchymosis tracking down to the ankle. Mild TTP on palpation, 5/5 Motor SILT , +2 DP    LABS:                        12.9   10.5  )-----------( 300      ( 04 May 2019 09:51 )             40.5     05-04    142  |  104  |  13.0  ----------------------------<  99  4.1   |  28.0  |  0.74    Ca    9.0      04 May 2019 09:51    TPro  6.8  /  Alb  3.5  /  TBili  0.3<L>  /  DBili  x   /  AST  26  /  ALT  34  /  AlkPhos  87  05-03    PT/INR - ( 04 May 2019 02:21 )   PT: 12.8 sec;   INR: 1.11 ratio         PTT - ( 04 May 2019 02:21 )  PTT:33.7 sec      MEDICATIONS  (STANDING):  clindamycin IVPB 600 milliGRAM(s) IV Intermittent every 8 hours  lactobacillus acidophilus 1 Tablet(s) Oral two times a day    MEDICATIONS  (PRN):  acetaminophen    Suspension .. 650 milliGRAM(s) Oral every 6 hours PRN Temp greater or equal to 38C (100.4F), Mild Pain (1 - 3)  ibuprofen  Tablet. 600 milliGRAM(s) Oral every 8 hours PRN Moderate Pain (4 - 6)      MICRO:   Cultures     STUDIES:   EKG, CXR, U/S, CT, MRI

## 2019-05-05 NOTE — DISCHARGE NOTE PROVIDER - HOSPITAL COURSE
Patient is a 43 year old male with no significant PMH who presented to the ED with LLE swelling and pain. Patient started history of present illness 1 week prior to admission characterized by worsening edema of left lower extremity. As per patient, he felt something popping on calf area last Saturday followed by swelling initially on calf area with mild erythema which subsequently extended distally to ankle and left foot. Patient states initially had pain 7/10 that partially was relieved with Motrin, but pain in now 2-3. Patient also reported subjective fevers or chills. In the ED, XRAYs were unremarkable but a venous duplex revealed a complex fluid collection for which surgery was consulted. A CT of the leg was obtained and was most consistent with a hematoma; infection could not be ruled out. Patient today seen ambulating without any pain or difficulty. Hemoglobin stable. Patient evaluated by surgery; no intervention required at this time. Recommend ACE wrap, leg elevation and warm compresses. Patient does not have a PMD, but was given contact information for [facility] to follow up this upcoming week. Patient states he must be discharged today due to financial concerns. Denies chest pain, SOB, palpitations, nausea, vomiting, abdominal pain, fevers or chills in the past 24 hours. Patient instructed to return to the ED, if swelling or pain worsens.         Weight baring status discussed with surgery - weight baring at tolerated.         Medically stable for discharge. Time spent on discharge 45 minutes.

## 2019-05-05 NOTE — PROGRESS NOTE ADULT - ASSESSMENT
43 YM with LLE calf swelling and ecchymosis, 15 x5 cm complex fluid collection most likely hematoma 2/2 tear in gastroc muscle , (traumatic)    - leg elevation warm compresses and compression with ace wrap.  - no surgical intervention needed at this time.   - Trauma surgery will sign off, please re-consult if any changes

## 2019-05-08 LAB
CULTURE RESULTS: SIGNIFICANT CHANGE UP
CULTURE RESULTS: SIGNIFICANT CHANGE UP
SPECIMEN SOURCE: SIGNIFICANT CHANGE UP
SPECIMEN SOURCE: SIGNIFICANT CHANGE UP

## 2019-08-13 PROBLEM — Z78.9 OTHER SPECIFIED HEALTH STATUS: Chronic | Status: ACTIVE | Noted: 2019-05-04

## 2019-08-28 ENCOUNTER — NON-APPOINTMENT (OUTPATIENT)
Age: 44
End: 2019-08-28

## 2019-08-28 ENCOUNTER — APPOINTMENT (OUTPATIENT)
Dept: FAMILY MEDICINE | Facility: CLINIC | Age: 44
End: 2019-08-28
Payer: COMMERCIAL

## 2019-08-28 VITALS
RESPIRATION RATE: 16 BRPM | BODY MASS INDEX: 40.43 KG/M2 | OXYGEN SATURATION: 98 % | HEART RATE: 85 BPM | DIASTOLIC BLOOD PRESSURE: 80 MMHG | TEMPERATURE: 97.6 F | HEIGHT: 74 IN | SYSTOLIC BLOOD PRESSURE: 120 MMHG | WEIGHT: 315 LBS

## 2019-08-28 DIAGNOSIS — Z80.7 FAMILY HISTORY OF OTHER MALIGNANT NEOPLASMS OF LYMPHOID, HEMATOPOIETIC AND RELATED TISSUES: ICD-10-CM

## 2019-08-28 DIAGNOSIS — R09.82 POSTNASAL DRIP: ICD-10-CM

## 2019-08-28 DIAGNOSIS — G47.33 OBSTRUCTIVE SLEEP APNEA (ADULT) (PEDIATRIC): ICD-10-CM

## 2019-08-28 DIAGNOSIS — G47.30 SLEEP APNEA, UNSPECIFIED: ICD-10-CM

## 2019-08-28 DIAGNOSIS — Z82.49 FAMILY HISTORY OF ISCHEMIC HEART DISEASE AND OTHER DISEASES OF THE CIRCULATORY SYSTEM: ICD-10-CM

## 2019-08-28 DIAGNOSIS — Z00.00 ENCOUNTER FOR GENERAL ADULT MEDICAL EXAMINATION W/OUT ABNORMAL FINDINGS: ICD-10-CM

## 2019-08-28 DIAGNOSIS — I83.93 ASYMPTOMATIC VARICOSE VEINS OF BILATERAL LOWER EXTREMITIES: ICD-10-CM

## 2019-08-28 LAB
BILIRUB UR QL STRIP: NORMAL
CLARITY UR: CLEAR
COLLECTION METHOD: NORMAL
GLUCOSE UR-MCNC: NORMAL
HCG UR QL: 0.2 EU/DL
HGB UR QL STRIP.AUTO: NORMAL
KETONES UR-MCNC: NORMAL
LEUKOCYTE ESTERASE UR QL STRIP: NORMAL
NITRITE UR QL STRIP: NORMAL
PH UR STRIP: 6
PROT UR STRIP-MCNC: NORMAL
SP GR UR STRIP: 1.02

## 2019-08-28 PROCEDURE — 81003 URINALYSIS AUTO W/O SCOPE: CPT | Mod: NC,QW

## 2019-08-28 PROCEDURE — 99396 PREV VISIT EST AGE 40-64: CPT | Mod: 25

## 2019-08-28 PROCEDURE — 93000 ELECTROCARDIOGRAM COMPLETE: CPT

## 2019-08-28 RX ORDER — MOMETASONE 50 UG/1
50 SPRAY, METERED NASAL DAILY
Qty: 1 | Refills: 3 | Status: ACTIVE | COMMUNITY
Start: 2019-08-28 | End: 1900-01-01

## 2019-08-28 NOTE — HISTORY OF PRESENT ILLNESS
[FreeTextEntry1] : pt establishment and physical\par NKDA\par [facility] [place] [de-identified] : Rashad states he needs new sleep study for snoring / sleep apnea. last used cpap about 5 yrs ago. He states he " ripped the mask off"\par but was not fitted for any other mask type.\par He c/o chronic PND x years

## 2019-08-28 NOTE — PHYSICAL EXAM
[Well Nourished] : well nourished [No Acute Distress] : no acute distress [Well Developed] : well developed [Well-Appearing] : well-appearing [Normal Sclera/Conjunctiva] : normal sclera/conjunctiva [PERRL] : pupils equal round and reactive to light [EOMI] : extraocular movements intact [Normal Outer Ear/Nose] : the outer ears and nose were normal in appearance [Normal Oropharynx] : the oropharynx was normal [No JVD] : no jugular venous distention [No Lymphadenopathy] : no lymphadenopathy [Supple] : supple [Thyroid Normal, No Nodules] : the thyroid was normal and there were no nodules present [No Respiratory Distress] : no respiratory distress  [No Accessory Muscle Use] : no accessory muscle use [Clear to Auscultation] : lungs were clear to auscultation bilaterally [Normal Rate] : normal rate  [Regular Rhythm] : with a regular rhythm [Normal S1, S2] : normal S1 and S2 [No Murmur] : no murmur heard [No Carotid Bruits] : no carotid bruits [No Abdominal Bruit] : a ~M bruit was not heard ~T in the abdomen [Pedal Pulses Present] : the pedal pulses are present [No Edema] : there was no peripheral edema [No Palpable Aorta] : no palpable aorta [No Extremity Clubbing/Cyanosis] : no extremity clubbing/cyanosis [Soft] : abdomen soft [Non Tender] : non-tender [Non-distended] : non-distended [No Masses] : no abdominal mass palpated [No HSM] : no HSM [Normal Bowel Sounds] : normal bowel sounds [Normal Posterior Cervical Nodes] : no posterior cervical lymphadenopathy [Normal Anterior Cervical Nodes] : no anterior cervical lymphadenopathy [No CVA Tenderness] : no CVA  tenderness [No Spinal Tenderness] : no spinal tenderness [No Joint Swelling] : no joint swelling [Grossly Normal Strength/Tone] : grossly normal strength/tone [No Rash] : no rash [Coordination Grossly Intact] : coordination grossly intact [No Focal Deficits] : no focal deficits [Normal Gait] : normal gait [Normal Affect] : the affect was normal [Deep Tendon Reflexes (DTR)] : deep tendon reflexes were 2+ and symmetric [Normal Insight/Judgement] : insight and judgment were intact [de-identified] : overweight [de-identified] : bilateral lower extremity varicose veins

## 2019-08-28 NOTE — HEALTH RISK ASSESSMENT
[Yes] : Yes [1 or 2 (0 pts)] : 1 or 2 (0 points) [Monthly or less (1 pt)] : Monthly or less (1 point) [Never (0 pts)] : Never (0 points) [No] : In the past 12 months have you used drugs other than those required for medical reasons? No [0] : 2) Feeling down, depressed, or hopeless: Not at all (0) [] : No [Audit-CScore] : 1 [[id number]] : 0

## 2021-03-10 ENCOUNTER — OUTPATIENT (OUTPATIENT)
Dept: OUTPATIENT SERVICES | Facility: HOSPITAL | Age: 46
LOS: 1 days | End: 2021-03-10
Payer: COMMERCIAL

## 2021-03-10 DIAGNOSIS — Z20.828 CONTACT WITH AND (SUSPECTED) EXPOSURE TO OTHER VIRAL COMMUNICABLE DISEASES: ICD-10-CM

## 2021-03-10 LAB — SARS-COV-2 RNA SPEC QL NAA+PROBE: SIGNIFICANT CHANGE UP

## 2021-03-10 PROCEDURE — U0003: CPT

## 2021-03-10 PROCEDURE — U0005: CPT
